# Patient Record
Sex: MALE | Race: WHITE | NOT HISPANIC OR LATINO | URBAN - METROPOLITAN AREA
[De-identification: names, ages, dates, MRNs, and addresses within clinical notes are randomized per-mention and may not be internally consistent; named-entity substitution may affect disease eponyms.]

---

## 2021-11-27 ENCOUNTER — EMERGENCY (EMERGENCY)
Facility: HOSPITAL | Age: 66
LOS: 1 days | Discharge: HOME | End: 2021-11-27
Attending: EMERGENCY MEDICINE
Payer: COMMERCIAL

## 2021-11-27 VITALS
WEIGHT: 218.04 LBS | DIASTOLIC BLOOD PRESSURE: 97 MMHG | RESPIRATION RATE: 18 BRPM | OXYGEN SATURATION: 98 % | HEIGHT: 67 IN | TEMPERATURE: 98 F | HEART RATE: 58 BPM | SYSTOLIC BLOOD PRESSURE: 176 MMHG

## 2021-11-27 PROCEDURE — 30901 CONTROL OF NOSEBLEED: CPT | Mod: LT

## 2021-11-27 PROCEDURE — 99283 EMERGENCY DEPT VISIT LOW MDM: CPT | Mod: 25

## 2021-11-27 PROCEDURE — 30901 CONTROL OF NOSEBLEED: CPT

## 2021-11-27 RX ADMIN — Medication 1 APPLICATION(S): at 23:39

## 2021-11-27 NOTE — ED PROVIDER NOTE - PATIENT PORTAL LINK FT
You can access the FollowMyHealth Patient Portal offered by [facility] by registering at the following website: [email or web address]. By joining [employer]’s FollowMyHealth portal, you will also be able to view your health information using other applications (apps) compatible with our system.

## 2021-11-27 NOTE — ED PROVIDER NOTE - NSFOLLOWUPINSTRUCTIONS_ED_ALL_ED_FT
1. Follow up our Ear-Nose-Throat clinic within 1 week.   2. Continue home medications as prescribed. Avoid picking nose/any digital trauma as this may cause rebleeding. Avoid blowing your nose tonight and tomorrow. as this may displace the blood clots and cause re-bleeding.   3. Recommend using over-the-counter saline nasal spray 1-2 sprays in each nostril per day. Recommend cool moist air humidifier at night time.  4. If bleeding occurs again, pinch and hold pressure to front of nostrils as instructed for at least 20 minutes.   5. Return to the Emergency Department immediately if you develop any new/worsening symptoms including bleeding not controlled after 20 minutes of pressure, lightheadedness, dizziness, palpitations, weakness or any other concerning symptoms.      See below for more detailed instructions.        Nosebleed    WHAT YOU NEED TO KNOW:    A nosebleed, or epistaxis, occurs when one or more of the blood vessels in your nose break. You may have dark or bright red blood from one or both nostrils. A nosebleed is most commonly caused by dry air or picking your nose. A direct blow to your nose, irritation from a cold or allergies, or a foreign object can also cause a nosebleed.     DISCHARGE INSTRUCTIONS:    Return to the emergency department if:   •Your nasal packing is soaked with blood.  •Your nose is still bleeding after 20 minutes, even after you pinch it.   •You have a foul-smelling discharge coming out of your nose.  •You feel so weak and dizzy that you have trouble standing up.  •You have trouble breathing or talking.     Contact your healthcare provider if:   •You have a fever and are vomiting.  •You have pain in and around your nose that is getting worse even after you take pain medicines.  •Your nasal pack is loose.  •You have questions or concerns about your condition or care.    First aid:   •Sit up and lean forward. This will help prevent you from swallowing blood. Spit blood and saliva into a bowl.   •Apply pressure to your nose. Use 2 fingers to pinch your nose shut for 10   •Apply ice on the bridge of your nose to decrease swelling and bleeding. Use a cold pack or put crushed ice in a plastic bag. Cover it with a towel to protect your skin.  •Pack your nose with a cotton ball, tissue, tampon, or gauze bandage to stop the bleeding.    Medicines:   •Medicines applied to a small piece of cotton and placed in your nose. Medicine may also be sprayed in or applied directly to your nose. You may need medicine to prevent an infection. If bleeding is severe, medicine may be injected into a blood vessel in your nose.   •Take your medicine as directed. Contact your healthcare provider if you think your medicine is not helping or if you have side effects. Tell him of her if you are allergic to any medicine. Keep a list of the medicines, vitamins, and herbs you take. Include the amounts, and when and why you take them. Bring the list or the pill bottles to follow-up visits. Carry your medicine list with you in case of an emergency.    Prevent another nosebleed:   •Keep your nose moist. Put a small amount of petroleum jelly inside your nostrils as needed. Use a saline (saltwater) nasal spray. Do not put anything else inside your nose unless your healthcare provider says it is okay. Do not use oil-based lubricants if you use oxygen therapy. They may be flammable.  •Use a cool mist humidifier to increase air moisture in your home. This will help your nose stay moist.   •Do not pick or blow your nose for at least a week. You can irritate or damage your nose if you pick it. Blowing your nose too hard may cause the bleeding to start again. Do not bend over or strain as this can cause the bleeding to start again.  •Avoid irritants such as tobacco smoke or chemical sprays such as .    Follow up with your healthcare provider as directed: Any packing in your nose should be removed within 2 to 3 days. Write down your questions so you remember to ask them during your visits.

## 2021-11-27 NOTE — ED PROVIDER NOTE - NSFOLLOWUPCLINICS_GEN_ALL_ED_FT
[facility] - ENT  Otolaryngology (ENT)  [address] [place] [address]  Phone: [phone]  Fax:   Follow Up Time: 4-6 Days

## 2021-11-27 NOTE — ED PROCEDURE NOTE - CPROC ED NASAL DETAIL1
The source of the bleeding was clearly identified./The area was cauterized with silver nitrate./The bleeding stopped.

## 2021-11-27 NOTE — ED ADULT NURSE NOTE - OBJECTIVE STATEMENT
66 year old male presents to ED via walk-in complaining of epistaxis x1 day. States "a few days ago" had 2 episodes of nose bleeding that resolved and today had another two episodes of left nostril bleeding, one prompted by picking nose, second one while watching face, unresolved. Went to urgent care and was told to come to emergency room for cauterization. Upon assessment A&O x4, ambulatory with steady gait and well appearing. Left nostril packed, no active bleeding. Denies feeling blood in back of throat. Airway patent. Bed locked and lowered. Comfort and safety measures maintained.

## 2021-11-27 NOTE — ED PROVIDER NOTE - OBJECTIVE STATEMENT
66y M w/ PMHx of HTN, sleep apnea and DM presents to the ED c/o epistaxis of L nostril starting last week w/ 2 incidents today. Pt reports that he went to [facility] [place], was sent here for cauterization. Today, sx started in the afternoon after digital trauma and again in the evening without trauma. Used to take baby aspirin but similar incidents have not happened in the past. Denies feeling blood in back of throat. Denies AC use.     Rx: Lisinopril, Metformin 66y M w/ PMHx of HTN, sleep apnea and DM presents to the ED c/o epistaxis of L nostril starting last week w/ 2 incidents today. Pt reports that he went to [facility] [place], was sent here for cauterization. Today, sx started in the afternoon after digital trauma and again in the evening without trauma. Used to take baby aspirin but similar incidents have not happened in the past. Denies feeling blood in back of throat. Denies AC use, lightheadedness, dizziness, cp, sob, palpitations.    Rx: Lisinopril, Metformin

## 2021-11-27 NOTE — ED PROVIDER NOTE - ATTENDING CONTRIBUTION TO CARE
Attending note (Blake): patient with h/o CHRIS on CPAP (uses non-humidified air) presenting with epistaxis after digital trauma; no active bleeding on exam, but irritate nasal mucosa and excoriated medial vessel noted, cauterized with silver nitrate; patient stable for dc, recommended outpatient ENT f/u and given strict return precautions for recurrent/persistent bleeding.

## 2021-11-27 NOTE — ED PROVIDER NOTE - NOSE FINDINGS
+dried blood noted to L nostril w/ small foci of oozing blood in anterior aspect of nostril./EPISTAXIS

## 2021-11-27 NOTE — ED PROCEDURE NOTE - ATTENDING CONTRIBUTION TO CARE
I have participated in and supervised all key portions of the above procedures and agree with the above documentation. CHAN Lozano MD

## 2021-11-27 NOTE — ED PROVIDER NOTE - PROGRESS NOTE DETAILS
oozing/bleeding source cauterize, no rebleed. Pt appears well. Will give outpt ENT f/u. - Stephon Pitts PA-C

## 2023-08-07 ENCOUNTER — NON-APPOINTMENT (OUTPATIENT)
Age: 68
End: 2023-08-07

## 2024-01-22 ENCOUNTER — EMERGENCY (EMERGENCY)
Facility: HOSPITAL | Age: 69
LOS: 1 days | Discharge: HOME | End: 2024-01-22
Attending: EMERGENCY MEDICINE
Payer: COMMERCIAL

## 2024-01-22 VITALS
HEART RATE: 71 BPM | RESPIRATION RATE: 21 BRPM | WEIGHT: 240.08 LBS | DIASTOLIC BLOOD PRESSURE: 103 MMHG | TEMPERATURE: 98 F | SYSTOLIC BLOOD PRESSURE: 178 MMHG | HEIGHT: 67 IN | OXYGEN SATURATION: 100 %

## 2024-01-22 VITALS
DIASTOLIC BLOOD PRESSURE: 88 MMHG | TEMPERATURE: 98 F | HEART RATE: 64 BPM | SYSTOLIC BLOOD PRESSURE: 151 MMHG | RESPIRATION RATE: 18 BRPM | OXYGEN SATURATION: 97 %

## 2024-01-22 PROBLEM — E11.9 TYPE 2 DIABETES MELLITUS WITHOUT COMPLICATIONS: Chronic | Status: ACTIVE | Noted: 2021-11-27

## 2024-01-22 PROBLEM — I10 ESSENTIAL (PRIMARY) HYPERTENSION: Chronic | Status: ACTIVE | Noted: 2021-11-27

## 2024-01-22 PROBLEM — G47.30 SLEEP APNEA, UNSPECIFIED: Chronic | Status: ACTIVE | Noted: 2021-11-27

## 2024-01-22 LAB
ALBUMIN SERPL ELPH-MCNC: 4.6 G/DL — SIGNIFICANT CHANGE UP (ref 3.3–5)
ALP SERPL-CCNC: 55 U/L — SIGNIFICANT CHANGE UP (ref 40–120)
ALT FLD-CCNC: 54 U/L — HIGH (ref 10–45)
ANION GAP SERPL CALC-SCNC: 15 MMOL/L — SIGNIFICANT CHANGE UP (ref 5–17)
APPEARANCE UR: CLEAR — SIGNIFICANT CHANGE UP
AST SERPL-CCNC: 37 U/L — SIGNIFICANT CHANGE UP (ref 10–40)
BACTERIA # UR AUTO: NEGATIVE /HPF — SIGNIFICANT CHANGE UP
BASE EXCESS BLDV CALC-SCNC: 1.2 MMOL/L — SIGNIFICANT CHANGE UP (ref -2–3)
BASOPHILS # BLD AUTO: 0.05 K/UL — SIGNIFICANT CHANGE UP (ref 0–0.2)
BASOPHILS NFR BLD AUTO: 0.4 % — SIGNIFICANT CHANGE UP (ref 0–2)
BILIRUB SERPL-MCNC: 0.4 MG/DL — SIGNIFICANT CHANGE UP (ref 0.2–1.2)
BILIRUB UR-MCNC: NEGATIVE — SIGNIFICANT CHANGE UP
BUN SERPL-MCNC: 17 MG/DL — SIGNIFICANT CHANGE UP (ref 7–23)
CA-I SERPL-SCNC: 1.23 MMOL/L — SIGNIFICANT CHANGE UP (ref 1.15–1.33)
CALCIUM SERPL-MCNC: 9.7 MG/DL — SIGNIFICANT CHANGE UP (ref 8.4–10.5)
CAST: 0 /LPF — SIGNIFICANT CHANGE UP (ref 0–4)
CHLORIDE BLDV-SCNC: 105 MMOL/L — SIGNIFICANT CHANGE UP (ref 96–108)
CHLORIDE SERPL-SCNC: 106 MMOL/L — SIGNIFICANT CHANGE UP (ref 96–108)
CO2 BLDV-SCNC: 27 MMOL/L — HIGH (ref 22–26)
CO2 SERPL-SCNC: 23 MMOL/L — SIGNIFICANT CHANGE UP (ref 22–31)
COLOR SPEC: YELLOW — SIGNIFICANT CHANGE UP
CREAT SERPL-MCNC: 1.05 MG/DL — SIGNIFICANT CHANGE UP (ref 0.5–1.3)
DIFF PNL FLD: ABNORMAL
EGFR: 76 ML/MIN/1.73M2 — SIGNIFICANT CHANGE UP
EOSINOPHIL # BLD AUTO: 0.17 K/UL — SIGNIFICANT CHANGE UP (ref 0–0.5)
EOSINOPHIL NFR BLD AUTO: 1.2 % — SIGNIFICANT CHANGE UP (ref 0–6)
GAS PNL BLDV: 139 MMOL/L — SIGNIFICANT CHANGE UP (ref 136–145)
GAS PNL BLDV: SIGNIFICANT CHANGE UP
GAS PNL BLDV: SIGNIFICANT CHANGE UP
GLUCOSE BLDV-MCNC: 112 MG/DL — HIGH (ref 70–99)
GLUCOSE SERPL-MCNC: 113 MG/DL — HIGH (ref 70–99)
GLUCOSE UR QL: NEGATIVE MG/DL — SIGNIFICANT CHANGE UP
HCO3 BLDV-SCNC: 26 MMOL/L — SIGNIFICANT CHANGE UP (ref 22–29)
HCT VFR BLD CALC: 45.3 % — SIGNIFICANT CHANGE UP (ref 39–50)
HCT VFR BLDA CALC: 47 % — SIGNIFICANT CHANGE UP (ref 39–51)
HGB BLD CALC-MCNC: 15.7 G/DL — SIGNIFICANT CHANGE UP (ref 12.6–17.4)
HGB BLD-MCNC: 15.2 G/DL — SIGNIFICANT CHANGE UP (ref 13–17)
IMM GRANULOCYTES NFR BLD AUTO: 0.4 % — SIGNIFICANT CHANGE UP (ref 0–0.9)
KETONES UR-MCNC: ABNORMAL MG/DL
LACTATE BLDV-MCNC: 1.9 MMOL/L — SIGNIFICANT CHANGE UP (ref 0.5–2)
LEUKOCYTE ESTERASE UR-ACNC: NEGATIVE — SIGNIFICANT CHANGE UP
LYMPHOCYTES # BLD AUTO: 1.85 K/UL — SIGNIFICANT CHANGE UP (ref 1–3.3)
LYMPHOCYTES # BLD AUTO: 13.1 % — SIGNIFICANT CHANGE UP (ref 13–44)
MCHC RBC-ENTMCNC: 28.3 PG — SIGNIFICANT CHANGE UP (ref 27–34)
MCHC RBC-ENTMCNC: 33.6 GM/DL — SIGNIFICANT CHANGE UP (ref 32–36)
MCV RBC AUTO: 84.2 FL — SIGNIFICANT CHANGE UP (ref 80–100)
MONOCYTES # BLD AUTO: 1.29 K/UL — HIGH (ref 0–0.9)
MONOCYTES NFR BLD AUTO: 9.2 % — SIGNIFICANT CHANGE UP (ref 2–14)
NEUTROPHILS # BLD AUTO: 10.68 K/UL — HIGH (ref 1.8–7.4)
NEUTROPHILS NFR BLD AUTO: 75.7 % — SIGNIFICANT CHANGE UP (ref 43–77)
NITRITE UR-MCNC: NEGATIVE — SIGNIFICANT CHANGE UP
NRBC # BLD: 0 /100 WBCS — SIGNIFICANT CHANGE UP (ref 0–0)
PCO2 BLDV: 41 MMHG — LOW (ref 42–55)
PH BLDV: 7.41 — SIGNIFICANT CHANGE UP (ref 7.32–7.43)
PH UR: 6 — SIGNIFICANT CHANGE UP (ref 5–8)
PLATELET # BLD AUTO: 194 K/UL — SIGNIFICANT CHANGE UP (ref 150–400)
PO2 BLDV: 31 MMHG — SIGNIFICANT CHANGE UP (ref 25–45)
POTASSIUM BLDV-SCNC: 4.2 MMOL/L — SIGNIFICANT CHANGE UP (ref 3.5–5.1)
POTASSIUM SERPL-MCNC: 4.2 MMOL/L — SIGNIFICANT CHANGE UP (ref 3.5–5.3)
POTASSIUM SERPL-SCNC: 4.2 MMOL/L — SIGNIFICANT CHANGE UP (ref 3.5–5.3)
PROT SERPL-MCNC: 7.3 G/DL — SIGNIFICANT CHANGE UP (ref 6–8.3)
PROT UR-MCNC: NEGATIVE MG/DL — SIGNIFICANT CHANGE UP
RBC # BLD: 5.38 M/UL — SIGNIFICANT CHANGE UP (ref 4.2–5.8)
RBC # FLD: 13.9 % — SIGNIFICANT CHANGE UP (ref 10.3–14.5)
RBC CASTS # UR COMP ASSIST: 177 /HPF — HIGH (ref 0–4)
SAO2 % BLDV: 59 % — LOW (ref 67–88)
SODIUM SERPL-SCNC: 144 MMOL/L — SIGNIFICANT CHANGE UP (ref 135–145)
SP GR SPEC: 1.01 — SIGNIFICANT CHANGE UP (ref 1–1.03)
SQUAMOUS # UR AUTO: 0 /HPF — SIGNIFICANT CHANGE UP (ref 0–5)
UROBILINOGEN FLD QL: 0.2 MG/DL — SIGNIFICANT CHANGE UP (ref 0.2–1)
WBC # BLD: 14.09 K/UL — HIGH (ref 3.8–10.5)
WBC # FLD AUTO: 14.09 K/UL — HIGH (ref 3.8–10.5)
WBC UR QL: 1 /HPF — SIGNIFICANT CHANGE UP (ref 0–5)

## 2024-01-22 PROCEDURE — G1004: CPT

## 2024-01-22 PROCEDURE — 96375 TX/PRO/DX INJ NEW DRUG ADDON: CPT

## 2024-01-22 PROCEDURE — 99284 EMERGENCY DEPT VISIT MOD MDM: CPT | Mod: 25

## 2024-01-22 PROCEDURE — 81001 URINALYSIS AUTO W/SCOPE: CPT

## 2024-01-22 PROCEDURE — 83605 ASSAY OF LACTIC ACID: CPT

## 2024-01-22 PROCEDURE — 84132 ASSAY OF SERUM POTASSIUM: CPT

## 2024-01-22 PROCEDURE — 80053 COMPREHEN METABOLIC PANEL: CPT

## 2024-01-22 PROCEDURE — 85018 HEMOGLOBIN: CPT

## 2024-01-22 PROCEDURE — 85014 HEMATOCRIT: CPT

## 2024-01-22 PROCEDURE — 82947 ASSAY GLUCOSE BLOOD QUANT: CPT

## 2024-01-22 PROCEDURE — 96366 THER/PROPH/DIAG IV INF ADDON: CPT

## 2024-01-22 PROCEDURE — 84295 ASSAY OF SERUM SODIUM: CPT

## 2024-01-22 PROCEDURE — 74176 CT ABD & PELVIS W/O CONTRAST: CPT | Mod: MG

## 2024-01-22 PROCEDURE — 82435 ASSAY OF BLOOD CHLORIDE: CPT

## 2024-01-22 PROCEDURE — 87086 URINE CULTURE/COLONY COUNT: CPT

## 2024-01-22 PROCEDURE — 96365 THER/PROPH/DIAG IV INF INIT: CPT

## 2024-01-22 PROCEDURE — 99284 EMERGENCY DEPT VISIT MOD MDM: CPT

## 2024-01-22 PROCEDURE — 82803 BLOOD GASES ANY COMBINATION: CPT

## 2024-01-22 PROCEDURE — 85025 COMPLETE CBC W/AUTO DIFF WBC: CPT

## 2024-01-22 PROCEDURE — 74176 CT ABD & PELVIS W/O CONTRAST: CPT | Mod: 26,MG

## 2024-01-22 PROCEDURE — 82330 ASSAY OF CALCIUM: CPT

## 2024-01-22 RX ORDER — ONDANSETRON 8 MG/1
4 TABLET, FILM COATED ORAL ONCE
Refills: 0 | Status: COMPLETED | OUTPATIENT
Start: 2024-01-22 | End: 2024-01-22

## 2024-01-22 RX ORDER — MORPHINE SULFATE 50 MG/1
4 CAPSULE, EXTENDED RELEASE ORAL ONCE
Refills: 0 | Status: DISCONTINUED | OUTPATIENT
Start: 2024-01-22 | End: 2024-01-22

## 2024-01-22 RX ORDER — SODIUM CHLORIDE 9 MG/ML
1000 INJECTION INTRAMUSCULAR; INTRAVENOUS; SUBCUTANEOUS ONCE
Refills: 0 | Status: COMPLETED | OUTPATIENT
Start: 2024-01-22 | End: 2024-01-22

## 2024-01-22 RX ORDER — TAMSULOSIN HYDROCHLORIDE 0.4 MG/1
1 CAPSULE ORAL
Qty: 30 | Refills: 0
Start: 2024-01-22 | End: 2024-02-20

## 2024-01-22 RX ORDER — KETOROLAC TROMETHAMINE 30 MG/ML
15 SYRINGE (ML) INJECTION ONCE
Refills: 0 | Status: DISCONTINUED | OUTPATIENT
Start: 2024-01-22 | End: 2024-01-22

## 2024-01-22 RX ORDER — ACETAMINOPHEN 500 MG
1000 TABLET ORAL ONCE
Refills: 0 | Status: COMPLETED | OUTPATIENT
Start: 2024-01-22 | End: 2024-01-22

## 2024-01-22 RX ADMIN — Medication 400 MILLIGRAM(S): at 14:08

## 2024-01-22 RX ADMIN — ONDANSETRON 4 MILLIGRAM(S): 8 TABLET, FILM COATED ORAL at 14:06

## 2024-01-22 RX ADMIN — SODIUM CHLORIDE 1000 MILLILITER(S): 9 INJECTION INTRAMUSCULAR; INTRAVENOUS; SUBCUTANEOUS at 19:37

## 2024-01-22 RX ADMIN — Medication 1000 MILLIGRAM(S): at 19:36

## 2024-01-22 RX ADMIN — Medication 15 MILLIGRAM(S): at 14:17

## 2024-01-22 RX ADMIN — SODIUM CHLORIDE 1000 MILLILITER(S): 9 INJECTION INTRAMUSCULAR; INTRAVENOUS; SUBCUTANEOUS at 14:17

## 2024-01-22 RX ADMIN — Medication 1000 MILLIGRAM(S): at 19:37

## 2024-01-22 RX ADMIN — Medication 15 MILLIGRAM(S): at 19:37

## 2024-01-22 NOTE — ED ADULT NURSE NOTE - OBJECTIVE STATEMENT
68 y.o. male coming in from home via private car for left flank pain x this AM. pt states that he started having left sided flank pain that he states radiates to LLQ. pt endorses nausea that started with the left sided flank pain. PMH HTN, diabetes, CHRIS. A&Ox3, vss, left flank pain with radiation to LLQ, increased tenderness with palpation, denies CP/SOB/weakness/dizziness/urinary symptoms/fevers/chills.

## 2024-01-22 NOTE — ED PROVIDER NOTE - ATTENDING CONTRIBUTION TO CARE
I, Deepak Delacruz MD, Emergency Medicine Attending Physician, personally saw and examined the patient and I personally made/approve the management plan and take responsibility for the patient management.    MDM: 68-year-old male with history of hypertension, hyperlipidemia [place], CHRIS on CPAP, who presents with left flank pain rating to the left lower quadrant x 1 day with associated nausea.  Patient was seen at urgent care and found to have microscopic hematuria.  Patient states pain is intermittent.  No history of kidney stones.    ROS: denies trauma, fevers, chills, chest pain, shortness of breath, vomiting, diarrhea, constipation, obstipation, rectal bleeding, dysuria, urinary frequency.      On examination, patient with elevated blood pressure but otherwise stable vitals, well-appearing, nontoxic. Cardiac examination RRR, lungs CTAB with no wheezing or rales or rhonchi, abdomen soft and (+) tenderness over the left lower quadrant and left CVA, neurovascularly intact in all 4 extremities.    Will obtain CT Abd/Pelv to assess for acute intraabdominal surgical and infectious pathology.  Will obtain labs to evaluate for hematologic disorder, metabolic derangements, hepatic and renal function, and screen for infection.  IV fluids, pain control, antiemetics, reassess.    Signed out at 1500 pending labs and imaging and close reassessments for further treatment and disposition decisions.

## 2024-01-22 NOTE — ED PROVIDER NOTE - CLINICAL SUMMARY MEDICAL DECISION MAKING FREE TEXT BOX
68M with PMH HTN, DM2, CHRIS on CPAP who presents to ED with acute, 1-day hx of intermittent, sharp left flank pain radiating to LLQ abdomen. No recent heavy lifting. Went [place] [facility] [place], +microscopic hematuria, told to come to ED for further eval. No hx of nephrolithiasis. Denies hx of abd surgery. Appears diaphoretic. Denies fevers, chills, lightheadedness, dizziness, headache, vision changes, chest pain, dyspnea, emesis, dysuria. Hypertensive 170s/100s in ED, afebrile. Physical exam notable for L CVA tenderness, LLQ TTP, no peritoneal signs. Also noted b/l LE pitting edema (per pt, cardiac stress test this past summer was normal). Ddx include pyelonephritis/symptomatic nephrolithiasis, less likely differentials include MSK pain or acute diverticulitis. Initial labs so far notable for leukocytosis with PMN predominance, pending UA, pursuing CT A/P non-con to r/o nephrolithiasis.

## 2024-01-22 NOTE — ED PROVIDER NOTE - PHYSICAL EXAMINATION
PHYSICAL EXAM:    GENERAL: Appears uncomfortable, anxious, diaphoretic  HEENT:  Atraumatic, MMM  CHEST/LUNG: Chest rise equal bilaterally; CTAB  HEART: Regular rate and rhythm; no m/r/g  ABDOMEN: Soft, Nondistended, +LLQ tender to palpation; no guarding or rebound; +BS  EXTREMITIES:  Extremities warm, 2+ pitting edema in b/l LE, symmetric.  PSYCH: A&Ox3  SKIN: No obvious rashes or lesions  MSK: +L CVA tenderness; no midline spinal or right CVA TTP  NEUROLOGY: strength and sensation intact in all extremities. CN 2 - 12 intact. Negative straight leg raise test.

## 2024-01-22 NOTE — ED PROVIDER NOTE - OBJECTIVE STATEMENT
68M with PMH HTN, DM2, CHRIS on CPAP who presents to ED with acute, 1-day hx of left flank pain radiating to LLQ abdomen.     Pt reports the sudden onset of symptom today AM, some moments after running on treadmill. Intermittent, sharp left flank pain that radiates anteriorly to LLQ abdomen. +Nausea, has not vomited. Pt feels that the pain is more internal and does "not feel like muscular pain." No recent heavy lifting. Went to [facility] [place] today, +microscopic hematuria, told to come to ED for further eval. No hx of nephrolithiasis. Denies hx of abd surgery. Appears diaphoretic. Denies fevers, chills, lightheadedness, dizziness, headache, vision changes, chest pain, dyspnea, emesis, dysuria. Of note, states that he had a cardiac stress test this past summer, reports normal results. 68M with PMH HTN, DM2, CHRIS on CPAP who presents to ED with acute, 1-day hx of left flank pain radiating to LLQ abdomen.     Pt reports the sudden onset of symptom today AM, some moments after running on treadmill. Intermittent, sharp left flank pain that radiates anteriorly to LLQ abdomen. +Nausea, has not vomited. Pt feels that the pain is more internal and does "not feel like muscular pain." No recent heavy lifting. [place] [facility] [place] [facility] [place]scopic hematuria, told to come to ED for further eval. No hx of nephrolithiasis. Denies hx of abd surgery. Denies fevers, chills, lightheadedness, dizziness, headache, vision changes, chest pain, dyspnea, emesis, dysuria. Of note, states that he had a cardiac stress test this past summer, reports normal results.

## 2024-01-22 NOTE — ED PROVIDER NOTE - RAPID ASSESSMENT
69 yo male PMHx HTN, HLD, diabetes presents ED complaining of acute onset left flank pain this morning.  First noted pain shortly after running today, thought it was a pulled muscle, pain intensified, felt in left flank radiating down to groin, intermittent and severe in nature.  Associated nausea with no vomiting.  Did not take anything for pain, went to urgent care was sent to ED for concern for kidney stone due to blood in urine noted on their testing.  Denies dysuria, fever/chills, vomiting, prior kidney stone, chest pain, shortness of breath.    **Patient was rapidly assessed by Stephon jimenez Kearney, PA-C. A limited history was obtained. The patient will be seen and further examined and worked up in the main ED and their care will be completed by the main ED team. Receiving team will follow up on labs, analgesia, any clinical imaging, and perform reassessment and disposition of the patient as clinically indicated. All decisions regarding the progression of care will be made at their discretion.

## 2024-01-22 NOTE — ED PROVIDER NOTE - NSFOLLOWUPCLINICS_GEN_ALL_ED_FT
[facility]  Urology  [address] [place] [address]  Phone: [phone]  Fax:   Follow Up Time: 1-3 Days

## 2024-01-22 NOTE — ED ADULT NURSE REASSESSMENT NOTE - NS ED NURSE REASSESS COMMENT FT1
Report received from MALLY Cutler. PT is resting comfortably in bed, breathing unlabored on room air, and speaking in complete sentences. Updated PT on plan of care. Awaiting resident MD to discuss CT results with PT. Safety and comfort maintained. Family at the bedside.

## 2024-01-22 NOTE — ED PROVIDER NOTE - NSFOLLOWUPINSTRUCTIONS_ED_ALL_ED_FT
The results of any blood tests and imaging studies completed during your visit today were discussed and explained to you and a copy provided with your discharge instructions. Please follow up with your primary care doctor within 48 hours. 1) Please follow up with your primary care physician within 1-2 days of hospital discharge for management of kidney stones found on CT scan.  2) Please make an appointment with a Urologist (contact attached below) within 1-3 days of hospital discharge for follow-up regarding kidney stones.  3) Please  medicine (Flomax 0.4 mg daily at bedtime) at pharmacy and start taking it as instructed.  4) You may use over-the-counter Tylenol every 6 hours as instructed (may add ibuprofen but should hold if contraindicated for your upcoming dental procedure) for pain.  5) SEEK IMMEDIATE MEDICAL CARE IF YOU HAVE ANY OF THE FOLLOWING SYMPTOMS: pain not controlled with medication, fever/chills, worsening vomiting, inability to urinate, or dizziness/lightheadedness.     Kidney Stones    Kidney stones (urolithiasis) are crystal deposits that form inside your kidneys. Pain is caused by the stone moving through the urinary tract, causing spasms of the ureter. Drink enough water and fluids to keep your urine clear or pale yellow. This will help you to pass the stone or stone fragments. If provided a strainer, strain all urine and keep all particulate matter and stones for a follow up appointment with a urologist.    SEEK IMMEDIATE MEDICAL CARE IF YOU HAVE ANY OF THE FOLLOWING SYMPTOMS: pain not controlled with medication, fever/chills, worsening vomiting, inability to urinate, or dizziness/lightheadedness.     The results of any blood tests and imaging studies completed during your visit today were discussed and explained to you and a copy provided with your discharge instructions. Please follow up with your primary care doctor within 48 hours.

## 2024-01-22 NOTE — ED ADULT TRIAGE NOTE - CHIEF COMPLAINT QUOTE
pt c/o L flank pain/LLQ pain starting this morning a/w hematuria   pt sent for UC to r/o kidney stone  pt denies dysuria, fevers, n/v

## 2024-01-22 NOTE — ED PROVIDER NOTE - PATIENT PORTAL LINK FT
You can access the FollowMyHealth Patient Portal offered by [facility] by registering at the following website: [email or web address]. By joining [employer]’s FollowMyHealth portal, you will also be able to view your health information using other applications (apps) compatible with our system.

## 2024-01-22 NOTE — ED ADULT NURSE NOTE - NSFALLUNIVINTERV_ED_ALL_ED
Bed/Stretcher in lowest position, wheels locked, appropriate side rails in place/Call bell, personal items and telephone in reach/Instruct patient to call for assistance before getting out of bed/chair/stretcher/Non-slip footwear applied when patient is off stretcher/[place] to call system/Physically safe environment - no spills, clutter or unnecessary equipment/Purposeful proactive rounding/Room/bathroom lighting operational, light cord in reach

## 2024-01-23 LAB
CULTURE RESULTS: NO GROWTH — SIGNIFICANT CHANGE UP
SPECIMEN SOURCE: SIGNIFICANT CHANGE UP